# Patient Record
(demographics unavailable — no encounter records)

---

## 2025-07-01 NOTE — PROCEDURE
[FreeTextEntry1] : Debridement of HD fifth PIPJ right foot.  Debride tyloma on the plantar aspect of the first metatarsal head right foot. Mechanical and electrical debridement of mycotic toenails 1 through 5 bilateral.

## 2025-07-01 NOTE — ASSESSMENT
[FreeTextEntry1] : Discussed and reviewed shoe gear with patient.  She is currently wearing on cloud athletic shoes and I suggested she lace them daily. Apply emollient lotion bilateral.  Advised patient to use a nail file on her toenails between visits. Return for follow-up care in 10 to 12 weeks or as needed pain.

## 2025-07-01 NOTE — PHYSICAL EXAM
[General Appearance - Alert] : alert [General Appearance - In No Acute Distress] : in no acute distress [Ankle Swelling (On Exam)] : not present [Varicose Veins Of Lower Extremities] : not present [] : not present [Delayed in the Right Toes] : capillary refills normal in right toes [Delayed in the Left Toes] : capillary refills normal in the left toes [2+] : left foot dorsalis pedis 2+ [de-identified] : Rotated and inverted fifth digit right foot. Hammertoe deformities bilateral. [FreeTextEntry1] : Mycotic and elongated toenails 1 through 5 bilateral. HD fifth PIPJ right foot.  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

## 2025-07-01 NOTE — HISTORY OF PRESENT ILLNESS
[FreeTextEntry1] : Patient admits to recent pain in her small right toe.  She denies any history of injury. 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 She is taking Eliquis and is afraid to care for her own toenails.

## 2025-07-01 NOTE — REASON FOR VISIT
[Initial Visit] : an initial visit for [FreeTextEntry2] : Patient presents for treatment of painful right small toe.  She also presents with elongated and thickened mycotic toenails bilateral.